# Patient Record
Sex: MALE | Race: WHITE | Employment: FULL TIME | URBAN - METROPOLITAN AREA
[De-identification: names, ages, dates, MRNs, and addresses within clinical notes are randomized per-mention and may not be internally consistent; named-entity substitution may affect disease eponyms.]

---

## 2018-04-13 PROBLEM — E66.9 OBESITY (BMI 35.0-39.9 WITHOUT COMORBIDITY): Status: ACTIVE | Noted: 2018-04-13

## 2018-04-13 PROBLEM — M25.551 CHRONIC HIP PAIN, RIGHT: Status: ACTIVE | Noted: 2018-04-13

## 2018-04-13 PROBLEM — M25.531 CHRONIC PAIN OF RIGHT WRIST: Status: ACTIVE | Noted: 2018-04-13

## 2018-04-13 PROBLEM — G89.29 CHRONIC HIP PAIN, RIGHT: Status: ACTIVE | Noted: 2018-04-13

## 2018-04-13 PROBLEM — G89.29 CHRONIC PAIN OF RIGHT WRIST: Status: ACTIVE | Noted: 2018-04-13

## 2018-04-17 PROBLEM — M16.11 PRIMARY OSTEOARTHRITIS OF RIGHT HIP: Status: ACTIVE | Noted: 2018-04-17

## 2018-04-17 PROBLEM — M16.12 PRIMARY OSTEOARTHRITIS OF LEFT HIP: Status: ACTIVE | Noted: 2018-04-17

## 2018-04-17 PROBLEM — E55.9 VITAMIN D DEFICIENCY: Status: ACTIVE | Noted: 2018-04-17

## 2018-04-17 PROBLEM — E78.2 MIXED HYPERLIPIDEMIA: Status: ACTIVE | Noted: 2018-04-17

## 2018-10-12 PROCEDURE — 87081 CULTURE SCREEN ONLY: CPT | Performed by: INTERNAL MEDICINE

## 2018-10-12 PROCEDURE — 81015 MICROSCOPIC EXAM OF URINE: CPT | Performed by: INTERNAL MEDICINE

## 2018-10-12 NOTE — H&P (VIEW-ONLY)
Pre-operative History and physical      Requesting physician: Dr. Moody Muñiz    HPI:    Patient presents for pre-operative assessment for right BILL to be performed by Dr. Asha Teague on 11/9. Patient has failed conservative treatment.   Patient denies chest pa anxiety      PHYSICAL EXAM:   VITAL SIGNS: /84   Pulse 79   Temp 97.9 °F (36.6 °C)   Ht 5' 10\" (1.778 m)   Wt 267 lb (121.1 kg)   SpO2 97%   BMI 38.31 kg/m²   CONSTITUTIONAL: No acute distress, well-developed  EYES: anicteric, EOMI  HENT: no masses,

## 2018-11-01 PROBLEM — Z47.89 ORTHOPEDIC AFTERCARE: Status: ACTIVE | Noted: 2018-11-01

## 2018-11-01 RX ORDER — OXYCODONE HYDROCHLORIDE 5 MG/1
5 TABLET ORAL EVERY 4 HOURS PRN
COMMUNITY
End: 2018-11-29 | Stop reason: ALTCHOICE

## 2018-11-06 ENCOUNTER — LAB ENCOUNTER (OUTPATIENT)
Dept: LAB | Age: 53
End: 2018-11-06
Attending: ORTHOPAEDIC SURGERY
Payer: COMMERCIAL

## 2018-11-06 DIAGNOSIS — M16.11 PRIMARY OSTEOARTHRITIS OF RIGHT HIP: ICD-10-CM

## 2018-11-06 PROCEDURE — 86900 BLOOD TYPING SEROLOGIC ABO: CPT

## 2018-11-06 PROCEDURE — 86901 BLOOD TYPING SEROLOGIC RH(D): CPT

## 2018-11-06 PROCEDURE — 86850 RBC ANTIBODY SCREEN: CPT

## 2018-11-07 ENCOUNTER — ANESTHESIA EVENT (OUTPATIENT)
Dept: SURGERY | Facility: HOSPITAL | Age: 53
DRG: 470 | End: 2018-11-07
Payer: COMMERCIAL

## 2018-11-07 RX ORDER — FAMOTIDINE 20 MG/1
20 TABLET ORAL ONCE
Status: CANCELLED | OUTPATIENT
Start: 2018-11-07 | End: 2018-11-07

## 2018-11-07 RX ORDER — METOCLOPRAMIDE 10 MG/1
10 TABLET ORAL ONCE
Status: CANCELLED | OUTPATIENT
Start: 2018-11-07 | End: 2018-11-07

## 2018-11-07 RX ORDER — LORAZEPAM 1 MG/1
1 TABLET ORAL ONCE
Status: CANCELLED | OUTPATIENT
Start: 2018-11-07 | End: 2018-11-07

## 2018-11-07 RX ORDER — MORPHINE SULFATE 2 MG/ML
2 INJECTION, SOLUTION INTRAMUSCULAR; INTRAVENOUS ONCE
Status: CANCELLED | OUTPATIENT
Start: 2018-11-07 | End: 2018-11-07

## 2018-11-07 RX ORDER — SODIUM CHLORIDE 9 MG/ML
83 INJECTION, SOLUTION INTRAVENOUS CONTINUOUS
Status: CANCELLED | OUTPATIENT
Start: 2018-11-08

## 2018-11-09 ENCOUNTER — APPOINTMENT (OUTPATIENT)
Dept: GENERAL RADIOLOGY | Facility: HOSPITAL | Age: 53
DRG: 470 | End: 2018-11-09
Attending: ORTHOPAEDIC SURGERY
Payer: COMMERCIAL

## 2018-11-09 ENCOUNTER — ANESTHESIA (OUTPATIENT)
Dept: SURGERY | Facility: HOSPITAL | Age: 53
DRG: 470 | End: 2018-11-09
Payer: COMMERCIAL

## 2018-11-09 ENCOUNTER — HOSPITAL ENCOUNTER (INPATIENT)
Facility: HOSPITAL | Age: 53
LOS: 1 days | Discharge: HOME HEALTH | DRG: 470 | End: 2018-11-10
Attending: ORTHOPAEDIC SURGERY | Admitting: ORTHOPAEDIC SURGERY
Payer: COMMERCIAL

## 2018-11-09 DIAGNOSIS — M16.11 PRIMARY OSTEOARTHRITIS OF RIGHT HIP: Primary | ICD-10-CM

## 2018-11-09 DIAGNOSIS — M25.551 RIGHT HIP PAIN: ICD-10-CM

## 2018-11-09 DIAGNOSIS — M16.11 ARTHRITIS OF RIGHT HIP: ICD-10-CM

## 2018-11-09 PROCEDURE — 97530 THERAPEUTIC ACTIVITIES: CPT

## 2018-11-09 PROCEDURE — 0SR90JZ REPLACEMENT OF RIGHT HIP JOINT WITH SYNTHETIC SUBSTITUTE, OPEN APPROACH: ICD-10-PCS | Performed by: ORTHOPAEDIC SURGERY

## 2018-11-09 PROCEDURE — 88311 DECALCIFY TISSUE: CPT | Performed by: ORTHOPAEDIC SURGERY

## 2018-11-09 PROCEDURE — 73502 X-RAY EXAM HIP UNI 2-3 VIEWS: CPT | Performed by: ORTHOPAEDIC SURGERY

## 2018-11-09 PROCEDURE — BQ101ZZ FLUOROSCOPY OF RIGHT HIP USING LOW OSMOLAR CONTRAST: ICD-10-PCS | Performed by: ORTHOPAEDIC SURGERY

## 2018-11-09 PROCEDURE — 97161 PT EVAL LOW COMPLEX 20 MIN: CPT

## 2018-11-09 PROCEDURE — 88305 TISSUE EXAM BY PATHOLOGIST: CPT | Performed by: ORTHOPAEDIC SURGERY

## 2018-11-09 PROCEDURE — 76001 XR C-ARM FLUORO >1 HOUR  (CPT=76001): CPT | Performed by: ORTHOPAEDIC SURGERY

## 2018-11-09 DEVICE — PINNACLE CANCELLOUS BONE SCREW 6.5MM X 25MM
Type: IMPLANTABLE DEVICE | Site: HIP | Status: FUNCTIONAL
Brand: PINNACLE

## 2018-11-09 DEVICE — PINNACLE GRIPTION ACETABULAR SHELL SECTOR 56MM OD
Type: IMPLANTABLE DEVICE | Site: HIP | Status: FUNCTIONAL
Brand: PINNACLE GRIPTION

## 2018-11-09 DEVICE — PINNACLE HIP SOLUTIONS ALTRX POLYETHYLENE ACETABULAR LINER +4 NEUTRAL 36MM ID 56MM OD
Type: IMPLANTABLE DEVICE | Site: HIP | Status: FUNCTIONAL
Brand: PINNACLE ALTRX

## 2018-11-09 DEVICE — BIOLOX DELTA CERAMIC FEMORAL HEAD +5.0 36MM DIA 12/14 TAPER
Type: IMPLANTABLE DEVICE | Site: HIP | Status: FUNCTIONAL
Brand: BIOLOX DELTA

## 2018-11-09 RX ORDER — ONDANSETRON 2 MG/ML
INJECTION INTRAMUSCULAR; INTRAVENOUS AS NEEDED
Status: DISCONTINUED | OUTPATIENT
Start: 2018-11-09 | End: 2018-11-09 | Stop reason: SURG

## 2018-11-09 RX ORDER — HYDROMORPHONE HYDROCHLORIDE 1 MG/ML
INJECTION, SOLUTION INTRAMUSCULAR; INTRAVENOUS; SUBCUTANEOUS AS NEEDED
Status: DISCONTINUED | OUTPATIENT
Start: 2018-11-09 | End: 2018-11-09 | Stop reason: SURG

## 2018-11-09 RX ORDER — NALOXONE HYDROCHLORIDE 0.4 MG/ML
80 INJECTION, SOLUTION INTRAMUSCULAR; INTRAVENOUS; SUBCUTANEOUS AS NEEDED
Status: DISCONTINUED | OUTPATIENT
Start: 2018-11-09 | End: 2018-11-09 | Stop reason: HOSPADM

## 2018-11-09 RX ORDER — SODIUM CHLORIDE 0.9 % (FLUSH) 0.9 %
10 SYRINGE (ML) INJECTION AS NEEDED
Status: DISCONTINUED | OUTPATIENT
Start: 2018-11-09 | End: 2018-11-10

## 2018-11-09 RX ORDER — ONDANSETRON 2 MG/ML
4 INJECTION INTRAMUSCULAR; INTRAVENOUS EVERY 4 HOURS PRN
Status: DISCONTINUED | OUTPATIENT
Start: 2018-11-09 | End: 2018-11-10

## 2018-11-09 RX ORDER — ROCURONIUM BROMIDE 10 MG/ML
INJECTION, SOLUTION INTRAVENOUS AS NEEDED
Status: DISCONTINUED | OUTPATIENT
Start: 2018-11-09 | End: 2018-11-09 | Stop reason: SURG

## 2018-11-09 RX ORDER — METOCLOPRAMIDE HYDROCHLORIDE 5 MG/ML
10 INJECTION INTRAMUSCULAR; INTRAVENOUS EVERY 6 HOURS PRN
Status: DISCONTINUED | OUTPATIENT
Start: 2018-11-09 | End: 2018-11-10

## 2018-11-09 RX ORDER — MORPHINE SULFATE 4 MG/ML
2 INJECTION, SOLUTION INTRAMUSCULAR; INTRAVENOUS EVERY 10 MIN PRN
Status: DISCONTINUED | OUTPATIENT
Start: 2018-11-09 | End: 2018-11-09 | Stop reason: HOSPADM

## 2018-11-09 RX ORDER — KETOROLAC TROMETHAMINE 30 MG/ML
30 INJECTION, SOLUTION INTRAMUSCULAR; INTRAVENOUS EVERY 6 HOURS PRN
Status: DISCONTINUED | OUTPATIENT
Start: 2018-11-09 | End: 2018-11-10

## 2018-11-09 RX ORDER — ACETAMINOPHEN 500 MG
1000 TABLET ORAL ONCE
Status: COMPLETED | OUTPATIENT
Start: 2018-11-09 | End: 2018-11-09

## 2018-11-09 RX ORDER — POLYETHYLENE GLYCOL 3350 17 G/17G
17 POWDER, FOR SOLUTION ORAL DAILY PRN
Status: DISCONTINUED | OUTPATIENT
Start: 2018-11-09 | End: 2018-11-10

## 2018-11-09 RX ORDER — MORPHINE SULFATE 10 MG/ML
6 INJECTION, SOLUTION INTRAMUSCULAR; INTRAVENOUS EVERY 10 MIN PRN
Status: DISCONTINUED | OUTPATIENT
Start: 2018-11-09 | End: 2018-11-09 | Stop reason: HOSPADM

## 2018-11-09 RX ORDER — LIDOCAINE HYDROCHLORIDE 10 MG/ML
INJECTION, SOLUTION EPIDURAL; INFILTRATION; INTRACAUDAL; PERINEURAL AS NEEDED
Status: DISCONTINUED | OUTPATIENT
Start: 2018-11-09 | End: 2018-11-09 | Stop reason: SURG

## 2018-11-09 RX ORDER — HALOPERIDOL 5 MG/ML
0.25 INJECTION INTRAMUSCULAR ONCE AS NEEDED
Status: DISCONTINUED | OUTPATIENT
Start: 2018-11-09 | End: 2018-11-09 | Stop reason: HOSPADM

## 2018-11-09 RX ORDER — DIPHENHYDRAMINE HCL 25 MG
25 CAPSULE ORAL EVERY 4 HOURS PRN
Status: DISCONTINUED | OUTPATIENT
Start: 2018-11-09 | End: 2018-11-10

## 2018-11-09 RX ORDER — MORPHINE SULFATE 4 MG/ML
4 INJECTION, SOLUTION INTRAMUSCULAR; INTRAVENOUS EVERY 10 MIN PRN
Status: DISCONTINUED | OUTPATIENT
Start: 2018-11-09 | End: 2018-11-09 | Stop reason: HOSPADM

## 2018-11-09 RX ORDER — ACETAMINOPHEN 325 MG/1
650 TABLET ORAL EVERY 4 HOURS PRN
Status: DISCONTINUED | OUTPATIENT
Start: 2018-11-09 | End: 2018-11-10

## 2018-11-09 RX ORDER — HYDROCODONE BITARTRATE AND ACETAMINOPHEN 7.5; 325 MG/1; MG/1
2 TABLET ORAL EVERY 4 HOURS PRN
Status: DISCONTINUED | OUTPATIENT
Start: 2018-11-09 | End: 2018-11-10

## 2018-11-09 RX ORDER — DOCUSATE SODIUM 100 MG/1
100 CAPSULE, LIQUID FILLED ORAL 2 TIMES DAILY
Status: DISCONTINUED | OUTPATIENT
Start: 2018-11-09 | End: 2018-11-10

## 2018-11-09 RX ORDER — METOCLOPRAMIDE 10 MG/1
10 TABLET ORAL ONCE
Status: COMPLETED | OUTPATIENT
Start: 2018-11-09 | End: 2018-11-09

## 2018-11-09 RX ORDER — SODIUM CHLORIDE, SODIUM LACTATE, POTASSIUM CHLORIDE, CALCIUM CHLORIDE 600; 310; 30; 20 MG/100ML; MG/100ML; MG/100ML; MG/100ML
INJECTION, SOLUTION INTRAVENOUS CONTINUOUS
Status: DISCONTINUED | OUTPATIENT
Start: 2018-11-09 | End: 2018-11-10

## 2018-11-09 RX ORDER — ONDANSETRON 2 MG/ML
4 INJECTION INTRAMUSCULAR; INTRAVENOUS ONCE AS NEEDED
Status: DISCONTINUED | OUTPATIENT
Start: 2018-11-09 | End: 2018-11-09 | Stop reason: HOSPADM

## 2018-11-09 RX ORDER — CELECOXIB 200 MG/1
400 CAPSULE ORAL ONCE
Status: COMPLETED | OUTPATIENT
Start: 2018-11-09 | End: 2018-11-09

## 2018-11-09 RX ORDER — ASPIRIN 325 MG
325 TABLET ORAL 2 TIMES DAILY
Status: DISCONTINUED | OUTPATIENT
Start: 2018-11-09 | End: 2018-11-10

## 2018-11-09 RX ORDER — DIPHENHYDRAMINE HYDROCHLORIDE 50 MG/ML
12.5 INJECTION INTRAMUSCULAR; INTRAVENOUS EVERY 4 HOURS PRN
Status: DISCONTINUED | OUTPATIENT
Start: 2018-11-09 | End: 2018-11-10

## 2018-11-09 RX ORDER — DIPHENHYDRAMINE HYDROCHLORIDE 50 MG/ML
25 INJECTION INTRAMUSCULAR; INTRAVENOUS ONCE AS NEEDED
Status: ACTIVE | OUTPATIENT
Start: 2018-11-09 | End: 2018-11-09

## 2018-11-09 RX ORDER — SENNOSIDES 8.6 MG
17.2 TABLET ORAL NIGHTLY
Status: DISCONTINUED | OUTPATIENT
Start: 2018-11-09 | End: 2018-11-10

## 2018-11-09 RX ORDER — HYDROCODONE BITARTRATE AND ACETAMINOPHEN 5; 325 MG/1; MG/1
2 TABLET ORAL AS NEEDED
Status: DISCONTINUED | OUTPATIENT
Start: 2018-11-09 | End: 2018-11-09 | Stop reason: HOSPADM

## 2018-11-09 RX ORDER — GABAPENTIN 600 MG/1
600 TABLET ORAL ONCE
Status: COMPLETED | OUTPATIENT
Start: 2018-11-09 | End: 2018-11-09

## 2018-11-09 RX ORDER — BISACODYL 10 MG
10 SUPPOSITORY, RECTAL RECTAL
Status: DISCONTINUED | OUTPATIENT
Start: 2018-11-09 | End: 2018-11-10

## 2018-11-09 RX ORDER — GLYCOPYRROLATE 0.2 MG/ML
INJECTION INTRAMUSCULAR; INTRAVENOUS AS NEEDED
Status: DISCONTINUED | OUTPATIENT
Start: 2018-11-09 | End: 2018-11-09 | Stop reason: SURG

## 2018-11-09 RX ORDER — SCOLOPAMINE TRANSDERMAL SYSTEM 1 MG/1
1 PATCH, EXTENDED RELEASE TRANSDERMAL ONCE
Status: DISCONTINUED | OUTPATIENT
Start: 2018-11-09 | End: 2018-11-10

## 2018-11-09 RX ORDER — MIDAZOLAM HYDROCHLORIDE 1 MG/ML
INJECTION INTRAMUSCULAR; INTRAVENOUS AS NEEDED
Status: DISCONTINUED | OUTPATIENT
Start: 2018-11-09 | End: 2018-11-09 | Stop reason: SURG

## 2018-11-09 RX ORDER — DEXAMETHASONE SODIUM PHOSPHATE 4 MG/ML
VIAL (ML) INJECTION AS NEEDED
Status: DISCONTINUED | OUTPATIENT
Start: 2018-11-09 | End: 2018-11-09 | Stop reason: SURG

## 2018-11-09 RX ORDER — FAMOTIDINE 20 MG/1
20 TABLET ORAL ONCE
Status: COMPLETED | OUTPATIENT
Start: 2018-11-09 | End: 2018-11-09

## 2018-11-09 RX ORDER — SODIUM CHLORIDE, SODIUM LACTATE, POTASSIUM CHLORIDE, CALCIUM CHLORIDE 600; 310; 30; 20 MG/100ML; MG/100ML; MG/100ML; MG/100ML
INJECTION, SOLUTION INTRAVENOUS CONTINUOUS
Status: DISCONTINUED | OUTPATIENT
Start: 2018-11-09 | End: 2018-11-09 | Stop reason: HOSPADM

## 2018-11-09 RX ORDER — NEOSTIGMINE METHYLSULFATE 0.5 MG/ML
INJECTION INTRAVENOUS AS NEEDED
Status: DISCONTINUED | OUTPATIENT
Start: 2018-11-09 | End: 2018-11-09 | Stop reason: SURG

## 2018-11-09 RX ORDER — MAGNESIUM HYDROXIDE 1200 MG/15ML
LIQUID ORAL CONTINUOUS PRN
Status: COMPLETED | OUTPATIENT
Start: 2018-11-09 | End: 2018-11-09

## 2018-11-09 RX ORDER — ACETAMINOPHEN 500 MG
1000 TABLET ORAL ONCE
Status: DISCONTINUED | OUTPATIENT
Start: 2018-11-09 | End: 2018-11-09

## 2018-11-09 RX ORDER — SODIUM PHOSPHATE, DIBASIC AND SODIUM PHOSPHATE, MONOBASIC 7; 19 G/133ML; G/133ML
1 ENEMA RECTAL ONCE AS NEEDED
Status: DISCONTINUED | OUTPATIENT
Start: 2018-11-09 | End: 2018-11-10

## 2018-11-09 RX ORDER — HYDROCODONE BITARTRATE AND ACETAMINOPHEN 7.5; 325 MG/1; MG/1
1 TABLET ORAL EVERY 4 HOURS PRN
Status: DISCONTINUED | OUTPATIENT
Start: 2018-11-09 | End: 2018-11-10

## 2018-11-09 RX ORDER — HYDROCODONE BITARTRATE AND ACETAMINOPHEN 5; 325 MG/1; MG/1
1 TABLET ORAL AS NEEDED
Status: DISCONTINUED | OUTPATIENT
Start: 2018-11-09 | End: 2018-11-09 | Stop reason: HOSPADM

## 2018-11-09 RX ADMIN — LIDOCAINE HYDROCHLORIDE 50 MG: 10 INJECTION, SOLUTION EPIDURAL; INFILTRATION; INTRACAUDAL; PERINEURAL at 10:22:00

## 2018-11-09 RX ADMIN — HYDROMORPHONE HYDROCHLORIDE 0.5 MG: 1 INJECTION, SOLUTION INTRAMUSCULAR; INTRAVENOUS; SUBCUTANEOUS at 10:56:00

## 2018-11-09 RX ADMIN — SODIUM CHLORIDE, SODIUM LACTATE, POTASSIUM CHLORIDE, CALCIUM CHLORIDE: 600; 310; 30; 20 INJECTION, SOLUTION INTRAVENOUS at 12:55:00

## 2018-11-09 RX ADMIN — SODIUM CHLORIDE, SODIUM LACTATE, POTASSIUM CHLORIDE, CALCIUM CHLORIDE: 600; 310; 30; 20 INJECTION, SOLUTION INTRAVENOUS at 10:20:00

## 2018-11-09 RX ADMIN — MIDAZOLAM HYDROCHLORIDE 2 MG: 1 INJECTION INTRAMUSCULAR; INTRAVENOUS at 10:17:00

## 2018-11-09 RX ADMIN — DEXAMETHASONE SODIUM PHOSPHATE 4 MG: 4 MG/ML VIAL (ML) INJECTION at 10:29:00

## 2018-11-09 RX ADMIN — ONDANSETRON 4 MG: 2 INJECTION INTRAMUSCULAR; INTRAVENOUS at 12:40:00

## 2018-11-09 RX ADMIN — ROCURONIUM BROMIDE 50 MG: 10 INJECTION, SOLUTION INTRAVENOUS at 10:22:00

## 2018-11-09 RX ADMIN — GLYCOPYRROLATE 0.4 MG: 0.2 INJECTION INTRAMUSCULAR; INTRAVENOUS at 12:45:00

## 2018-11-09 RX ADMIN — NEOSTIGMINE METHYLSULFATE 3 MG: 0.5 INJECTION INTRAVENOUS at 12:45:00

## 2018-11-09 RX ADMIN — SODIUM CHLORIDE, SODIUM LACTATE, POTASSIUM CHLORIDE, CALCIUM CHLORIDE: 600; 310; 30; 20 INJECTION, SOLUTION INTRAVENOUS at 12:27:00

## 2018-11-09 NOTE — INTERVAL H&P NOTE
Pre-op Diagnosis: Right hip pain [M25.551]  Arthritis of right hip [M16.11]    The above referenced H&P was reviewed by Odessa Conde DO on 11/9/2018, the patient was examined and no significant changes have occurred in the patient's condition since the

## 2018-11-09 NOTE — CM/SW NOTE
SW met with the patient at bedside. The patient lives with his wife in a house. The patient responds being independent prior to admission with all ADL's, ambulation and driving. Patient denies use of any DME. Patient would like to go home with [facility].     Re

## 2018-11-09 NOTE — H&P
[facility] Hospitalist Team  History and Physical     ASSESSMENT / PLAN:   49 yo male with obesity- BMI 37, MSSA nares, vitamin d def and OA who presents for hip surgery.  Pt is S/P Right Total Hip Arthroplasty, Anterior Approach, see below for details    S/P Roxane CARDIOVASCULAR: regular,nl S1 S2; no murmur, no gallop,  no rub   ABDOMEN:  Soft, BS+; non distended, non tender    EXTREMITIES: right hip with dressing and STEFFANIE     PMH  Past Medical History:   Diagnosis Date   • Mixed hyperlipidemia 4/17/2018   • Osteo Mary Grace Del Rosario MD on 11/09/2018 at 13:55              SEE ATTENDING NOTE BELOW      Patient seen and examined independently. Discussed with APN and agree with note above.       HPI   Mr. Gorge Gutierrez is a 49 yo male with obesity- BMI 37, MSSA tip, vitamin d def

## 2018-11-09 NOTE — BRIEF OP NOTE
Pre-Operative Diagnosis: Right hip pain [M25.551]  Arthritis of right hip [M16.11]     Post-Operative Diagnosis: Right hip pain [M25.551]Arthritis of right hip [M16.11]      Procedure Performed:   Procedure(s):  RIGHT TOTAL HIP REPLACEMENT ANTERIOR APPROAC

## 2018-11-09 NOTE — ANESTHESIA POSTPROCEDURE EVALUATION
Patient: Mati Gonsales    Procedure Summary     Date:  11/09/18 Room / Location:  [facility] OR 11 / [facility] OR    Anesthesia Start:  1748 Anesthesia Stop:  3453    Procedure:  HIP TOTAL ANTERIOR APPROACH (Right ) Diagnosis:       Right hip pain      Arthr

## 2018-11-09 NOTE — ANESTHESIA PREPROCEDURE EVALUATION
Anesthesia PreOp Note    HPI:     Bill Ferreira is a 48year old male who presents for preoperative consultation requested by: Zacarias Moreno DO    Date of Surgery: 11/9/2018    Procedure(s):  HIP TOTAL ANTERIOR APPROACH  Indication: Right hip domenic Unknown at Unknown time   celecoxib (CELEBREX) 200 MG Oral Cap Take 1 capsule (200 mg total) by mouth daily. Disp: 14 capsule Rfl: 0 Unknown at Unknown time   pregabalin (LYRICA) 75 MG Oral Cap Take 1 capsule (75 mg total) by mouth daily.  Disp: 14 capsule file    Tobacco Use      Smoking status: Former Smoker      Smokeless tobacco: Former User        Quit date: 1/1/2000    Substance and Sexual Activity      Alcohol use: Yes        Comment: 6 pack a week      Drug use: No      Sexual activity: Not on file LMA  Post-op Pain Management: IV analgesics and Oral pain medication  Informed Consent Plan and Risks Discussed With:  Patient and spouse  Discussed plan with:  CRNA      I have informed Anne Gaitan of the nature of the anesthetic plan, benefits, risks

## 2018-11-09 NOTE — PHYSICAL THERAPY NOTE
PHYSICAL THERAPY HIP EVALUATION - INPATIENT     Room Number: 433/433-A  Evaluation Date: 11/9/2018  Type of Evaluation: Initial  Physician Order: PT Eval and Treat    Presenting Problem: R THR   Reason for Therapy: Mobility Dysfunction and Discharge Planni of right hip 4/17/2018   • Primary osteoarthritis of right wrist    • Visual impairment     WEARS READERS   • Vitamin D deficiency 4/17/2018       Past Surgical History  Past Surgical History:   Procedure Laterality Date   • HIP REPLACEMENT SURGERY Left 11 A Little   -   Sitting down on and standing up from a chair with arms (e.g., wheelchair, bedside commode, etc.): A Little   -   Moving from lying on back to sitting on the side of the bed?: A Little   How much help from another person does the patient curr Goal #5   Current Status    Goal #6 Patient independently performs home exercise program for ROM/strengthening per the instructions provided in preparation for discharge.    Goal #6  Current Status

## 2018-11-09 NOTE — OPERATIVE REPORT
DOS 11/9/2018  PREOPERATIVE DIAGNOSIS: RIGHT Hip Arthritis    POSTOPERATIVE DIAGNOSIS: Same    PROCEDURE: RIGHT Total Hip arthroplasty, Direct anterior approach    SURGEON: Aracelis Parham.  Neil Durant, [facility] [facility] [address]: [address]; Magda Barclay capsule was identified and a Cobra retractor was placed along the superior neck. The capsulotomy was performed in line of the femoral neck from the Trochanteric ridge to the Acetabulum.   The capsule was tagged with #1 Ethibond and was then further releas the joint was thoroughly irrigated with normal saline. Following this, the anterior capsule was repaired with ethibond and the Tensor Fascia was closed with 0-Vicryl. The subcutaneous layer was closed with 0 and  2-0 Vicryl suture in an inverted fashion.  3

## 2018-11-09 NOTE — ANESTHESIA PROCEDURE NOTES
ANESTHESIA INTUBATION  Date/Time: 11/9/2018 10:27 AM  Urgency: elective    Airway not difficult    General Information and Staff    Patient location during procedure: OR  Anesthesiologist: Guilherme Gonzales MD  Resident/CRNA: Jacqueline Corea, CRNA

## 2018-11-10 VITALS
TEMPERATURE: 100 F | HEIGHT: 70 IN | HEART RATE: 82 BPM | OXYGEN SATURATION: 95 % | SYSTOLIC BLOOD PRESSURE: 120 MMHG | DIASTOLIC BLOOD PRESSURE: 66 MMHG | RESPIRATION RATE: 16 BRPM | WEIGHT: 261.31 LBS | BODY MASS INDEX: 37.41 KG/M2

## 2018-11-10 PROCEDURE — 97535 SELF CARE MNGMENT TRAINING: CPT

## 2018-11-10 PROCEDURE — 97165 OT EVAL LOW COMPLEX 30 MIN: CPT

## 2018-11-10 PROCEDURE — 85025 COMPLETE CBC W/AUTO DIFF WBC: CPT | Performed by: NURSE PRACTITIONER

## 2018-11-10 PROCEDURE — 97116 GAIT TRAINING THERAPY: CPT

## 2018-11-10 PROCEDURE — 80048 BASIC METABOLIC PNL TOTAL CA: CPT | Performed by: NURSE PRACTITIONER

## 2018-11-10 PROCEDURE — 97110 THERAPEUTIC EXERCISES: CPT

## 2018-11-10 PROCEDURE — 85027 COMPLETE CBC AUTOMATED: CPT | Performed by: ORTHOPAEDIC SURGERY

## 2018-11-10 PROCEDURE — 90471 IMMUNIZATION ADMIN: CPT

## 2018-11-10 RX ORDER — PSEUDOEPHEDRINE HCL 30 MG
100 TABLET ORAL 2 TIMES DAILY
Qty: 30 CAPSULE | Refills: 0 | Status: SHIPPED | OUTPATIENT
Start: 2018-11-10 | End: 2018-11-29 | Stop reason: ALTCHOICE

## 2018-11-10 NOTE — OCCUPATIONAL THERAPY NOTE
OCCUPATIONAL THERAPY EVALUATION - INPATIENT      Room Number: 433/433-A  Evaluation Date: 11/10/2018  Type of Evaluation: Initial  Presenting Problem: R BILL, anterior    Physician Order: IP Consult to Occupational Therapy  Reason for Therapy: ADL/IADL Dysf Equipment: Comfort height toilet; Toilet riser  Shower/Tub and Equipment: Walk-in shower          Hand Dominance: Right  Drives: Yes  Patient Regularly Uses: Reading glasses    Stairs in Home: 2 story, 2 LEAH  Assistive Device(s) Used: None     Prior Level o Standing: Good  Dynamic Standing: Good    FUNCTIONAL ADL ASSESSMENT  Grooming: Mod I standing at the sink for [facility]. Feeding: Independent  Toileting: Independent hygiene  Upper Body Dressing: SBA   Lower Body Dressing: SBA with Yarely Sotelo.  Provided resources to

## 2018-11-10 NOTE — HOME CARE LIAISON
Met with patient at the bedside, prior to discharge. Patient is agreeable to [facility]. Brochure and liaison's card provided with contact information. All questions addressed and answered.

## 2018-11-10 NOTE — PROGRESS NOTES
[facility] [facility] Patient Status:  Inpatient    9/15/1965 MRN [id number]   Location [facility] [place], [place] 4W/SW/SE Attending Morena Zamora, [facility] Day # 1 PCP No primary care provider on file.      Suresh Rivera is a 48 Leg  Minimal swelling  Compartments soft  Incision: Clean, Dry and Intact without erythema or drainage  Distally N/V/I  < 3 sec capillary refill  No calf tenderness (-) Homans' sign      Assessment & Plan   POD #1 Right direct anterior total hip    1.  PT/O

## 2018-11-10 NOTE — DISCHARGE SUMMARY
[facility] Internal Medicine Discharge Summary   Patient ID:  Bernardo Umana  [id number]  48year old  9/15/1965    Admit date: 11/9/2018    Discharge date and time: 11/10/2018 11:24 AM     Attending Physician: No att. providers found     Primary Care Physician: hip. No CP, SOB, abdominal pain, N/V. STEFFANIE device in place    Hospital course   Mr. Avinash Green is a 49 yo male with obesity- BMI 37, MSSA nares, vitamin d def and OA who presents for hip surgery. Pt is S/P Right Total Hip Arthroplasty. Did very well post op.   H CONCLUSION:  1. Intraoperative fluoroscopy was utilized.      Dictated by (CST): Gilford Speck, MD on 11/09/2018 at 14:24     Approved by (CST): Yulia Nair MD on 11/09/2018 at 14:30          Xr Hip W Or Wo Pelvis 2 Or 3 Views, [facility]

## 2018-11-10 NOTE — PHYSICAL THERAPY NOTE
PHYSICAL THERAPY TREATMENT NOTE - INPATIENT     Room Number: 433/433-A       Presenting Problem: R THR     Problem List  Principal Problem:    Primary osteoarthritis of right hip      ASSESSMENT   Pt is I in all areas of mobility, while being supervised on Scale): 61.14   CMS Modifier (G-Code): CH    FUNCTIONAL ABILITY STATUS  Gait Assessment   Gait Assistance: Independent  Distance (ft): 300  Assistive Device: Rolling walker  Pattern: Within Functional Limits  Stoop/Curb Assistance: Supervision  Comment : (

## 2018-11-10 NOTE — CM/SW NOTE
RN informed SW that pt is going to discharge today. [facility] AT [facility] orders have been entered and SW notified Dewey Ralph from [facility].      Vinny Molina

## 2019-01-31 PROCEDURE — 87086 URINE CULTURE/COLONY COUNT: CPT | Performed by: INTERNAL MEDICINE

## 2019-01-31 PROCEDURE — 87186 SC STD MICRODIL/AGAR DIL: CPT | Performed by: INTERNAL MEDICINE

## 2019-01-31 PROCEDURE — 87077 CULTURE AEROBIC IDENTIFY: CPT | Performed by: INTERNAL MEDICINE

## 2020-04-17 PROCEDURE — 88305 TISSUE EXAM BY PATHOLOGIST: CPT | Performed by: INTERNAL MEDICINE

## (undated) DEVICE — PHOTONSABER Y METAL TIP

## (undated) DEVICE — FAN SPRAY KIT: Brand: PULSAVAC®

## (undated) DEVICE — DRAPE SHEET LG

## (undated) DEVICE — BIPOLAR SEALER 23-112-1 AQM 6.0: Brand: AQUAMANTYS™

## (undated) DEVICE — DRAPE SRG 26X15IN UTL TPE STRL

## (undated) DEVICE — NEEDLE SPINAL 18X3-1/2 PINK.

## (undated) DEVICE — 1010 S-DRAPE TOWEL DRAPE 10/BX: Brand: STERI-DRAPE™

## (undated) DEVICE — SUTURE VICRYL 2-0 FS-1

## (undated) DEVICE — BATTERY

## (undated) DEVICE — UNDYED BRAIDED (POLYGLACTIN 910), SYNTHETIC ABSORBABLE SUTURE: Brand: COATED VICRYL

## (undated) DEVICE — T5 HOOD WITH PEEL AWAY FACE SHIELD

## (undated) DEVICE — SYRINGE MNJCT 35ML LF STRL LL

## (undated) DEVICE — VIOLET BRAIDED (POLYGLACTIN 910), SYNTHETIC ABSORBABLE SUTURE: Brand: COATED VICRYL

## (undated) DEVICE — POSITIONER OR KT PT CR

## (undated) DEVICE — BLADE SAW SAGITTAL 19.5

## (undated) DEVICE — PICO SINGLE USE NEGATIVE PRESSURE WOUND THERAPY SYSTEM 10CM X 30CM 4IN. X 12IN.: Brand: PICO

## (undated) DEVICE — SOL  .9 1000ML BAG

## (undated) DEVICE — 450 ML BOTTLE OF 0.05% CHLORHEXIDINE GLUCONATE IN 99.95% STERILE WATER FOR IRRIGATION, USP AND APPLICATOR.: Brand: IRRISEPT ANTIMICROBIAL WOUND LAVAGE

## (undated) DEVICE — CLIPPER BLADE 3M

## (undated) DEVICE — STERILE POLYISOPRENE POWDER-FREE SURGICAL GLOVES: Brand: PROTEXIS

## (undated) DEVICE — 3M™ STERI-STRIP™ REINFORCED ADHESIVE SKIN CLOSURES, R1547, 1/2 IN X 4 IN (12 MM X 100 MM), 6 STRIPS/ENVELOPE: Brand: 3M™ STERI-STRIP™

## (undated) DEVICE — GOWN SURG AERO BLUE PERF XLG

## (undated) DEVICE — WEBRIL COTTON UNDERCAST PADDING: Brand: WEBRIL

## (undated) DEVICE — WOUND RETRACTOR AND PROTECTOR: Brand: ALEXIS O WOUND PROTECTOR-RETRACTOR

## (undated) DEVICE — CHLORAPREP 26ML APPLICATOR

## (undated) DEVICE — DRESSING AQUACEL AG 3.5 X 10

## (undated) DEVICE — ANTERIOR HIP: Brand: MEDLINE INDUSTRIES, INC.

## (undated) DEVICE — GAUZE SPONGES,12 PLY: Brand: CURITY

## (undated) DEVICE — SUTURE MONOCRYL 3-0 Y936H

## (undated) DEVICE — CONTAINER SPEC STR 4OZ GRY LID

## (undated) DEVICE — SOL  .9 3000ML

## (undated) DEVICE — GOWN SRG XL IMPRV BRTHBL STRL

## (undated) DEVICE — SOL  .9 1000ML BTL